# Patient Record
Sex: FEMALE | Race: WHITE
[De-identification: names, ages, dates, MRNs, and addresses within clinical notes are randomized per-mention and may not be internally consistent; named-entity substitution may affect disease eponyms.]

---

## 2021-01-05 ENCOUNTER — HOSPITAL ENCOUNTER (INPATIENT)
Dept: HOSPITAL 92 - ERS | Age: 76
LOS: 2 days | Discharge: NURSING, REHAB OR LONG-TERM CARE | DRG: 563 | End: 2021-01-07
Attending: SPECIALIST | Admitting: SPECIALIST
Payer: MEDICARE

## 2021-01-05 VITALS — BODY MASS INDEX: 34.8 KG/M2

## 2021-01-05 DIAGNOSIS — Z20.822: ICD-10-CM

## 2021-01-05 DIAGNOSIS — S92.001A: Primary | ICD-10-CM

## 2021-01-05 DIAGNOSIS — E03.9: ICD-10-CM

## 2021-01-05 DIAGNOSIS — Z90.710: ICD-10-CM

## 2021-01-05 DIAGNOSIS — K21.9: ICD-10-CM

## 2021-01-05 DIAGNOSIS — Z79.890: ICD-10-CM
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
## 2021-01-05 DIAGNOSIS — S92.002A: ICD-10-CM

## 2021-01-05 DIAGNOSIS — Z79.899: ICD-10-CM

## 2021-01-05 LAB
ALBUMIN SERPL BCG-MCNC: 4.1 G/DL (ref 3.4–4.8)
ALP SERPL-CCNC: 82 U/L (ref 40–110)
ALT SERPL W P-5'-P-CCNC: 14 U/L (ref 8–55)
ANION GAP SERPL CALC-SCNC: 16 MMOL/L (ref 10–20)
APTT PPP: 29 SEC (ref 22.9–36.1)
AST SERPL-CCNC: 18 U/L (ref 5–34)
BASOPHILS # BLD AUTO: 0.1 THOU/UL (ref 0–0.2)
BASOPHILS NFR BLD AUTO: 0.5 % (ref 0–1)
BILIRUB SERPL-MCNC: 0.3 MG/DL (ref 0.2–1.2)
BUN SERPL-MCNC: 17 MG/DL (ref 9.8–20.1)
CALCIUM SERPL-MCNC: 9.2 MG/DL (ref 7.8–10.44)
CHLORIDE SERPL-SCNC: 102 MMOL/L (ref 98–107)
CO2 SERPL-SCNC: 23 MMOL/L (ref 23–31)
CREAT CL PREDICTED SERPL C-G-VRATE: 0 ML/MIN (ref 70–130)
EOSINOPHIL # BLD AUTO: 0.1 THOU/UL (ref 0–0.7)
EOSINOPHIL NFR BLD AUTO: 0.5 % (ref 0–10)
GLOBULIN SER CALC-MCNC: 3.1 G/DL (ref 2.4–3.5)
GLUCOSE SERPL-MCNC: 105 MG/DL (ref 83–110)
HGB BLD-MCNC: 13 G/DL (ref 12–16)
INR PPP: 0.9
LYMPHOCYTES # BLD: 1.4 THOU/UL (ref 1.2–3.4)
LYMPHOCYTES NFR BLD AUTO: 9.3 % (ref 21–51)
MCH RBC QN AUTO: 30.6 PG (ref 27–31)
MCV RBC AUTO: 90.4 FL (ref 78–98)
MONOCYTES # BLD AUTO: 1 THOU/UL (ref 0.11–0.59)
MONOCYTES NFR BLD AUTO: 6.4 % (ref 0–10)
NEUTROPHILS # BLD AUTO: 13 THOU/UL (ref 1.4–6.5)
NEUTROPHILS NFR BLD AUTO: 83.3 % (ref 42–75)
PLATELET # BLD AUTO: 252 THOU/UL (ref 130–400)
POTASSIUM SERPL-SCNC: 4.1 MMOL/L (ref 3.5–5.1)
PROTHROMBIN TIME: 12.7 SEC (ref 12–14.7)
RBC # BLD AUTO: 4.26 MILL/UL (ref 4.2–5.4)
SODIUM SERPL-SCNC: 137 MMOL/L (ref 136–145)
WBC # BLD AUTO: 15.6 THOU/UL (ref 4.8–10.8)

## 2021-01-05 PROCEDURE — G0390 TRAUMA RESPONS W/HOSP CRITI: HCPCS

## 2021-01-05 PROCEDURE — 84100 ASSAY OF PHOSPHORUS: CPT

## 2021-01-05 PROCEDURE — 87635 SARS-COV-2 COVID-19 AMP PRB: CPT

## 2021-01-05 PROCEDURE — 85730 THROMBOPLASTIN TIME PARTIAL: CPT

## 2021-01-05 PROCEDURE — 85610 PROTHROMBIN TIME: CPT

## 2021-01-05 PROCEDURE — 71045 X-RAY EXAM CHEST 1 VIEW: CPT

## 2021-01-05 PROCEDURE — 83735 ASSAY OF MAGNESIUM: CPT

## 2021-01-05 PROCEDURE — 80053 COMPREHEN METABOLIC PANEL: CPT

## 2021-01-05 PROCEDURE — 28400 CLTX CALCANEAL FX W/O MNPJ: CPT

## 2021-01-05 PROCEDURE — 85025 COMPLETE CBC W/AUTO DIFF WBC: CPT

## 2021-01-05 PROCEDURE — 96374 THER/PROPH/DIAG INJ IV PUSH: CPT

## 2021-01-05 PROCEDURE — U0003 INFECTIOUS AGENT DETECTION BY NUCLEIC ACID (DNA OR RNA); SEVERE ACUTE RESPIRATORY SYNDROME CORONAVIRUS 2 (SARS-COV-2) (CORONAVIRUS DISEASE [COVID-19]), AMPLIFIED PROBE TECHNIQUE, MAKING USE OF HIGH THROUGHPUT TECHNOLOGIES AS DESCRIBED BY CMS-2020-01-R: HCPCS

## 2021-01-05 NOTE — HP
TRAUMA SURGEON:  Dr. Lott.



CONSULTING PHYSICIAN:  Dr. Marquez.



HISTORY OF PRESENT ILLNESS:  The patient is a 75-year-old female, who presented to

the emergency department after a fall about 5 feet from a ladder.  The patient

reports that she was changing her air conditioner vent return filters when she lost

her balance and fell backwards.  States that she landed on her heels and then on her

buttocks.  She denies hitting her head.  Reports no anticoagulation use.  She then

crawled to her bedroom and called her family.  EMS brought her to the emergency

department.  At the time of my evaluation, she had splints to the bilateral lower

extremities and she was complaining of bilateral heel pain.  She denies numbness and

tingling in her bilateral upper and lower extremities, nausea, vomiting, diarrhea,

cough, chest pain, shortness of breath.  States that she does not have any back or

buttock pain.  She lives at home alone.  Her family lives out of town. 



REVIEW OF SYSTEMS:  All additional 10-point review of systems negative except as

indicated above. 



PAST MEDICAL HISTORY:  Hypothyroidism, GERD, and vertigo.



PAST SURGICAL HISTORY:  Hysterectomy and bilateral knee surgeries for ligamentous

injuries, and scope for cartilage-related issues. 



SOCIAL HISTORY:  The patient denies tobacco, drug, and alcohol use.  She does not

use any walker or cane to get around.  She lives at home alone. 



MEDICATIONS:  Omeprazole and levothyroxine.



ALLERGIES:  NO KNOWN DRUG ALLERGIES.



PHYSICAL EXAMINATION:

VITAL SIGNS:  Temperature 99.1, pulse 88, respirations 18, oxygen saturation 97% on

room air, blood pressure 150/85. 



PRIMARY SURVEY: 

Airway intact. 

Adequate breath sounds bilaterally. 

2+ pulses in bilateral radials, femorals, and DPs. 

GCS 15.  Gross motor and sensation are intact. 

No lacerations, bruising, or external bleeding.  She has splints to bilateral lower

extremities. 



SECONDARY SURVEY: 

HEAD:  Normocephalic, atraumatic.  No gross palpable skull deformities or

tenderness. 

EYES:  Pupils 3-2, equal, round, reactive to light bilaterally. 

ENT:  No signs of trauma. 

NECK:  C-spine, no step-offs or deformities.  Nontender.  C-collar not in place. 

CHEST:  Nontender.  No crepitus.  No abrasions or ecchymosis noted. 

ABDOMEN:  Soft, nontender, nondistended. 

PELVIS:  Stable to palpation.  Nontender.  No abrasions or ecchymosis noted. 

RECTAL:  Deferred. 

GENITOURINARY:  Deferred. 

EXTREMITIES:  The patient has splints to bilateral lower extremities.  No deformity

is noted.  2+ pulses in bilateral radials, femorals, and DPs. 

BACK/SPINE:  No step-offs or deformities or tenderness to palpation of thoracic or

lumbar spine.  No abrasions or ecchymosis noted. 

NEUROLOGIC:  5/5 strength in bilateral , The patient is able to wiggle toes.

Bilateral feet are splinted.  Gross normal sensation x4 extremities. 



LABORATORY FINDINGS:  White count 15.6, hemoglobin 13.0, hematocrit 38.5, platelets

252.  INR 0.9.  Sodium 137, potassium 4.1, chloride 107, bicarb 23, BUN 17,

creatinine 1.05, glucose 105, phosphorus 3.2, magnesium 1.7, total bilirubin 0.3,

AST 18, ALT 14, alkaline phosphatase 82. 



DIAGNOSTIC FINDINGS:  Chest x-ray demonstrates no acute cardiopulmonary process,

atherosclerosis.  X-ray of the left foot demonstrates calcaneal fracture with

associated soft tissue swelling.  X-ray of the right ankle demonstrates calcaneal

fracture with associated soft tissue swelling.  X-ray of the left ankle demonstrates

soft tissue swelling and calcaneal fracture.  No evidence of a fracture at the level

of the ankle.  X-ray of the right foot demonstrates calcaneal fracture. 



ASSESSMENT:  

1. Status post mechanical fall from 5 feet.

2. Bilateral calcaneal fractures.

3. History of gastroesophageal reflux disease, hypothyroidism, and vertigo.



PLAN:  The patient will be admitted to the Trauma Service.  She will go to the

regular surgical nursing floor.  She will have a COVID test.  Orthopedic Surgery was

consulted.  They reported they will evaluate the patient tomorrow, in case they want

to do surgery, we will make the patient n.p.o. at midnight.  Right now, she can have

a regular diet.  Repeat blood work in the morning.  The patient will likely need

placement in acute rehab facility regardless if she has surgery as she lives alone.

This patient was discussed with Dr. Lott before this dictation. 







Job ID:  [id number]

## 2021-01-05 NOTE — RAD
Exam:3 views left ankle



HISTORY: Trauma. Fall. Pain.



COMPARISON: None



FINDINGS: Calcaneal fracture with associated soft tissue swelling. Ankle mortise is intact. No eviden
ce of a ankle fracture. There is soft tissue swelling.



IMPRESSION: Soft tissue swelling and calcaneal fracture. No evidence of a fracture at the level of th
e ankle.



Reported By: Zachary Frias 

Electronically Signed:  1/5/2021 7:03 PM

## 2021-01-05 NOTE — RAD
Exam:4 views of right foot



HISTORY: Fall. Pain. Swelling.



COMPARISON: None



FINDINGS: Calcaneal fracture with subtalar extension. Associated soft tissue swelling. Lisfranc align
ment is maintained.



IMPRESSION: Calcaneal fracture.



Reported By: Zachary Frias 

Electronically Signed:  1/5/2021 7:01 PM

## 2021-01-05 NOTE — RAD
Exam:4 views left foot



HISTORY: Fall. Pain.



COMPARISON: None



FINDINGS: Calcaneal fracture, comminuted with subtalar extension. Lisfranc alignment is maintained. J
oint spaces are preserved. There is soft tissue swelling at the level of the calcaneus.



IMPRESSION: Calcaneal fracture with associated soft tissue swelling.



Reported By: Zachary Frias 

Electronically Signed:  1/5/2021 7:01 PM

## 2021-01-05 NOTE — RAD
Exam: Chest one view



HISTORY:Status post trauma. Preoperative evaluation.



Comparison: None



FINDINGS:

Cardiac silhouette: Normal

Aorta: Atherosclerosis

Pulmonary vessels: Normal

Costophrenic angles: Clear



LUNGS: No masses or consolidation. Chronic lung parenchymal changes are suspected.



Pneumothorax: None



Osseous abnormalities: None



IMPRESSION: No acute cardiopulmonary process. Atherosclerosis



Reported By: Zachary Frias 

Electronically Signed:  1/5/2021 9:07 PM

## 2021-01-05 NOTE — RAD
Exam:Right ankle 3 views



HISTORY: Fall. Pain.



COMPARISON: None



FINDINGS: Calcaneal fracture with associated soft tissue swelling

Ankle mortise is intact. No evidence of a ankle fracture.



IMPRESSION: Calcaneal fracture with associated soft tissue swelling.



Reported By: Zachary Frias 

Electronically Signed:  1/5/2021 7:03 PM

## 2021-01-06 RX ADMIN — DOCUSATE SODIUM 50 MG AND SENNOSIDES 8.6 MG SCH: 8.6; 5 TABLET, FILM COATED ORAL at 08:27

## 2021-01-06 RX ADMIN — DOCUSATE SODIUM 50 MG AND SENNOSIDES 8.6 MG SCH TAB: 8.6; 5 TABLET, FILM COATED ORAL at 21:42

## 2021-01-06 NOTE — CON
DATE OF CONSULTATION:  01/06/2021



This is Lynsey Gay PA-C dictating a report for Santosh Marquez MD. 



REQUESTING PHYSICIAN:  Trauma Services.



CONSULTING PHYSICIAN:  Dr. Marquez.



REASON FOR CONSULTATION:  Bilateral calcaneal fractures.



HISTORY OF PRESENT ILLNESS:  This is a 75-year-old female, who presented to our

emergency department after a fall, 5 feet from a ladder.  The patient states that

she was changing her air conditioner vent return filters when she lost her balance

and fell backwards landing onto her heels and then her buttocks.  She denied head

injury or loss of consciousness.  No anticoagulation use.  Workup in the emergency

department revealed bilateral calcaneus fractures.  We have been consulted for this

reason.  Currently at bedside, she denies any other pain other than in her heels.

She denies any numbness or tingling.  Denies any previous foot or ankle problems.

Denies any head injury. 



PAST MEDICAL HISTORY:  Significant for hypothyroidism, vertigo, and GERD.



PAST SURGICAL HISTORY:  Hysterectomy and bilateral knee surgeries for ligamentous

injuries and knee scopes for cartilage related issues. 



SOCIAL HISTORY:  Patient denies tobacco, alcohol, or drug use.  She is an

independent ambulator at home.  She does live alone. 



ALLERGIES:  NO KNOWN DRUG ALLERGIES.



FAMILY HISTORY:  Reviewed and noncontributory.



MEDICATIONS:  Omeprazole and levothyroxine.



PHYSICAL EXAMINATION:

VITAL SIGNS:  Shows current vital signs including temperature 98 degrees, pulse rate

80, respiratory rate 16, O2 saturation of 96% on room air, and blood pressure

144/75. 

GENERAL:  The patient is awake and alert.  She is in no apparent distress.  She is

pleasant and cooperative with exam today. 

HEENT:  Head is normocephalic and atraumatic. 

NECK:  Supple.  Trachea midline.  Breathing is nonlabored. 

EXTREMITIES:  Evaluation of bilateral lower extremities shows a short-leg posterior

splint intact to bilateral lower extremities.  Dressings overlying knees are clean,

dry, and intact.  She is able to wiggle her toes.  Sensation intact.  Capillary

refill 3 seconds.  Skin is intact at the splint edges.  Bilateral upper extremities,

she moves without any difficulty.  No signs of trauma. 



RADIOGRAPHIC IMAGING:  Reviewed today including bilateral ankle x-rays as well as

bilateral foot x-rays, shows evidence of bilateral calcaneus fractures.  The right

calcaneus appears nondisplaced.  Acceptable alignment.  This fracture does travel

into the joint.  No other fractures are visualized.  Ankle mortise is intact.  This

is a 3-part fracture of the calcaneus on the right.  Views of the left calcaneus

fracture demonstrate a calcaneus fracture which extends into the joint.  There is

minimal joint depression.  No significant displacement.  Otherwise, no fractures are

visualized.  Ankle mortise is intact. 



ASSESSMENT:  Bilateral calcaneal fractures, nondisplaced.



PLAN:  At this time, we will plan for nonsurgical intervention.  These fractures

appear well aligned.  We will treat this conservatively.  The patient is in

bilateral ankle splints.  We will continue immobilization.  She will be

nonweightbearing to bilateral lower extremities.  We did discuss the plan of care.

She will likely be nonweightbearing for 2 to 3 months.  We will let her eat and have

therapy come work with her.  They only focus on transfers.  She will likely need

rehab given that she is living at home alone. 







Job ID:  [id number]

## 2021-01-06 NOTE — PRG
DATE OF SERVICE:  



HISTORY OF PRESENT ILLNESS:  The patient is currently on the surgical floor.  She is

status post fall from approximately 5 feet off a ladder in which she sustained

bilateral calcaneal fractures.  The patient was evaluated this morning and

Orthopedic determined that she would be best managed non-operatively as her

fractures aligned quite well at this time.  Unfortunately, the patient will be

nonweightbearing for 2-3 months.  Overnight, the patient had no issues.  Her pain is

controlled and we will discontinue her n.p.o. status. 



PHYSICAL EXAMINATION:

VITAL SIGNS:  Temperature is 98.0, heart rate 80, blood pressure 144/75,

respirations 16, and oxygen saturation is 96% on room air. 

GENERAL:  The patient is resting comfortably in bed.  She is awake, alert, and

oriented.  Stout Coma Scale is 15. 

HEENT:  Unremarkable. 

LUNGS:  Clear to auscultation with good inspiratory and expiratory effort. 

HEART:  Regular rate and rhythm. 

ABDOMEN:  Soft, nondistended. EXTREMITIES:  Neurovascularly intact x4.  Bilateral

lower extremities are immobilized in a posterior leg splint.  She is neurovascularly

intact x4.  Capillary refill is less than 3 seconds. 



DIAGNOSTIC STUDIES:  There are no labs or radiographs reviewed this morning.



ASSESSMENT/PLAN:  

1. Status post fall from ladder of approximately 5 feet.

2. Bilateral calcaneal fractures.

3. History of esophageal reflux disease, hypothyroidism, and vertigo.



PLAN:  Plan will be to begin physical and occupational therapy.  Continue pain

control, pulmonary toilet, gastritis and mechanical VTE prophylaxis.  We will start

chemical VTE 

prophylaxis in the morning and work on placement.  The evaluation, examination were

done with Dr. Weston during rounds this morning. 







Job ID:  [id number]

## 2021-01-07 VITALS — DIASTOLIC BLOOD PRESSURE: 71 MMHG | SYSTOLIC BLOOD PRESSURE: 164 MMHG | TEMPERATURE: 97.7 F

## 2021-01-07 RX ADMIN — DOCUSATE SODIUM 50 MG AND SENNOSIDES 8.6 MG SCH TAB: 8.6; 5 TABLET, FILM COATED ORAL at 08:38

## 2021-01-08 NOTE — DIS
DATE OF ADMISSION:  01/05/2021



DATE OF DISCHARGE:  01/07/2021



ADMISSION DIAGNOSES:  

1. Status post fall from ladder approximately 5 feet.

2. Bilateral closed nondisplaced calcaneal fractures.

3. Acute pain secondary to above.

4. History of gastroesophageal reflux disease, hypothyroidism, and vertigo.



CONSULTATIONS:  Orthopedic, Dr. Marquez.



PROCEDURES:  None.



SUMMARY:  The patient is a 75-year-old woman who was on a ladder when she fell

backwards landing on both of her heels.  She had severe pain, was able to get to her

phone, call her son who called 911, and she was brought to the emergency department

via ground EMS, where she underwent evaluation and on examination was noted to have

the above injuries.  She will be treated nonoperatively, but unfortunately she was

going to be nonweightbearing on both lower extremities for approximately 2 to 3

months.  The patient was discharged to inpatient rehab for continued therapy.  At

the time of discharge, the patient was tolerating a diet.  Her pain was controlled

and she was able to work with transfers and various other things with therapy.  She

will follow up with [facility] in 2 to 3 weeks or sooner as needed.  She

had her splints replaced by the orthopedic PA prior to departure to ensure that they

were well padded.  The patient will continue her home medications and pain

medications during her rehab.  The patient may follow up with [facility] as

needed. 







Job ID:  [id number]